# Patient Record
Sex: MALE | Race: WHITE | NOT HISPANIC OR LATINO | Employment: OTHER | URBAN - METROPOLITAN AREA
[De-identification: names, ages, dates, MRNs, and addresses within clinical notes are randomized per-mention and may not be internally consistent; named-entity substitution may affect disease eponyms.]

---

## 2020-11-23 ENCOUNTER — OFFICE VISIT (OUTPATIENT)
Dept: NEUROSURGERY | Facility: CLINIC | Age: 70
End: 2020-11-23

## 2020-11-23 VITALS
TEMPERATURE: 97.6 F | DIASTOLIC BLOOD PRESSURE: 80 MMHG | SYSTOLIC BLOOD PRESSURE: 180 MMHG | HEIGHT: 70 IN | BODY MASS INDEX: 39.31 KG/M2 | WEIGHT: 274.6 LBS

## 2020-11-23 DIAGNOSIS — M48.062 SPINAL STENOSIS, LUMBAR REGION, WITH NEUROGENIC CLAUDICATION: ICD-10-CM

## 2020-11-23 DIAGNOSIS — M51.36 DEGENERATIVE DISC DISEASE, LUMBAR: Primary | ICD-10-CM

## 2020-11-23 PROCEDURE — 99203 OFFICE O/P NEW LOW 30 MIN: CPT | Performed by: NEUROLOGICAL SURGERY

## 2020-11-23 RX ORDER — PIOGLITAZONEHYDROCHLORIDE 45 MG/1
TABLET ORAL
COMMUNITY
Start: 2020-11-16

## 2020-11-23 RX ORDER — HYDROCHLOROTHIAZIDE 12.5 MG/1
CAPSULE, GELATIN COATED ORAL
COMMUNITY
Start: 2020-11-16

## 2020-11-23 RX ORDER — GLIPIZIDE 5 MG/1
TABLET ORAL
COMMUNITY
Start: 2020-11-16

## 2020-11-23 RX ORDER — SIMVASTATIN 40 MG
TABLET ORAL
COMMUNITY
Start: 2020-11-16

## 2020-11-23 RX ORDER — NABUMETONE 750 MG/1
750 TABLET, FILM COATED ORAL 2 TIMES DAILY
Qty: 30 TABLET | Refills: 0 | Status: SHIPPED | OUTPATIENT
Start: 2020-11-23

## 2020-11-23 RX ORDER — HYDROCODONE BITARTRATE AND ACETAMINOPHEN 7.5; 325 MG/1; MG/1
1 TABLET ORAL EVERY 6 HOURS PRN
Qty: 45 TABLET | Refills: 0 | Status: SHIPPED | OUTPATIENT
Start: 2020-11-23

## 2020-11-23 RX ORDER — BENAZEPRIL HYDROCHLORIDE 40 MG/1
TABLET, FILM COATED ORAL
COMMUNITY
Start: 2020-11-16

## 2020-11-23 RX ORDER — OMEPRAZOLE 40 MG/1
CAPSULE, DELAYED RELEASE ORAL
COMMUNITY
Start: 2020-11-16

## 2020-11-23 RX ORDER — METHOCARBAMOL 750 MG/1
750 TABLET, FILM COATED ORAL NIGHTLY
Qty: 30 TABLET | Refills: 0 | Status: SHIPPED | OUTPATIENT
Start: 2020-11-23 | End: 2020-12-07

## 2020-11-23 NOTE — PROGRESS NOTES
Iggy Little  1950  [id number]      Chief Complaint   Patient presents with   • Back Pain       HISTORY OF PRESENT ILLNESS: This is a 70-year-old male seen with a chief complaint of pain in the back rating down to both lower extremities.  He previously has had a laminectomy and discectomy is done reasonably well from 2012.  That time he had a laminectomy L4.  His pain has increased considerably to the point that he is unable to walk stand for prolonged periods of time without having severe pain in his back.  It radiates primary to his thigh but not distally.  No bowel bladder dysfunction.  A CT scan was performed and he is referred for neurosurgical consultation.    No past medical history on file.    No past surgical history on file.    No family history on file.    Social History     Socioeconomic History   • Marital status:      Spouse name: Not on file   • Number of children: Not on file   • Years of education: Not on file   • Highest education level: Not on file       Not on File    No current outpatient medications on file.    Review of Systems   Constitutional: Negative for activity change, appetite change, chills, diaphoresis, fatigue, fever and unexpected weight change.   HENT: Positive for postnasal drip and tinnitus. Negative for congestion, dental problem, drooling, ear discharge, ear pain, facial swelling, hearing loss, mouth sores, nosebleeds, rhinorrhea, sinus pressure, sinus pain, sneezing, sore throat, trouble swallowing and voice change.    Eyes: Positive for itching. Negative for photophobia, pain, discharge, redness and visual disturbance.   Respiratory: Positive for apnea and shortness of breath. Negative for cough, choking, chest tightness, wheezing and stridor.    Cardiovascular: Negative for chest pain, palpitations and leg swelling.   Gastrointestinal: Negative for abdominal distention, abdominal pain, anal bleeding, blood in stool, constipation, diarrhea, nausea, rectal pain and  vomiting.   Endocrine: Negative for cold intolerance, heat intolerance, polydipsia, polyphagia and polyuria.   Genitourinary: Negative for decreased urine volume, difficulty urinating, discharge, dysuria, enuresis, flank pain, frequency, genital sores, hematuria, penile pain, penile swelling, scrotal swelling, testicular pain and urgency.   Musculoskeletal: Positive for back pain, neck pain and neck stiffness. Negative for arthralgias, gait problem, joint swelling and myalgias.   Skin: Negative for color change, pallor, rash and wound.   Allergic/Immunologic: Negative for environmental allergies, food allergies and immunocompromised state.   Neurological: Positive for numbness. Negative for dizziness, tremors, seizures, syncope, facial asymmetry, speech difficulty, weakness, light-headedness and headaches.   Hematological: Negative for adenopathy. Does not bruise/bleed easily.   Psychiatric/Behavioral: Negative for agitation, behavioral problems, confusion, decreased concentration, dysphoric mood, hallucinations, self-injury, sleep disturbance and suicidal ideas. The patient is not nervous/anxious and is not hyperactive.        There were no vitals filed for this visit.    Neurological Examination:    Mental status/speech: The patient is alert and oriented.  Speech is clear without aphysia or dysarthria.  No overt cognitive deficits.    Cranial nerve examination:    Olfaction: Smell is intact.  Vision: Vision is intact without visual field abnormalities.  Funduscopic examination is normal.  No pupillary irregularity.  Ocular motor examination: The extraocular muscles are intact.  There is no diplopia.  The pupil is round and reactive to both light and accommodation.  There is no nystagmus.  Facial movement/sensation: There is no facial weakness.  Sensation is intact in the first, second, and third divisions of the trigeminal nerve.  The corneal reflex is intact.  Auditory: Hearing is intact to finger rub  bilaterally.  Cranial nerves IX, X, XI, XII: Phonation is normal.  No dysphagia.  Tongue is protruded in the midline without atrophy.  The gag reflex is intact.  Shoulder shrug is normal.    Musculoligamentous ligamentous examination: BMI 39.4; weight 274 pounds.  Limited range of motion including flexion extension lateral rotation lateral bending.  Straight leg raising produces back pain.  He is areflexic.  His strength appears to be intact.  He has considerable amount of pain with movement standing and walking        Medical Decision Making:     Diagnostic Data Set: The CT scan data set show advanced, diffuse degenerative osteoarthritis associated with spinal stenosis at a variety of levels from L2 distally.      Assessment: Lumbar spinal stenosis with neurogenic claudication          Recommendations: I have ordered a lumbar MRI.  Based on his narrative and examination most likely he will require laminectomies.  I have given a prescription of Relafen 750 mg twice daily, Robaxin-750 milligrams at night and Lortab 7.5 for pain management.  He will return to see me on the same day that he has his MRI.  Epidural steroid injection or facet blocks may be a therapeutic option although with the degree of his arthritis and stenosis is quite significant.        I greatly appreciate the opportunity to see and evaluate this individual.  If you have questions or concerns regarding issues that I may have overlooked please call me at any time: [phone].  John Lam M.D.  [facility]  [address] .  [facility], located within [facility]  [id number]

## 2020-11-25 ENCOUNTER — TELEPHONE (OUTPATIENT)
Dept: NEUROSURGERY | Facility: CLINIC | Age: 70
End: 2020-11-25

## 2020-11-25 NOTE — TELEPHONE ENCOUNTER
Caller: TEOFILO DOMINGUEZ    Relationship to patient: PT    Best call back number: [phone]    Chief complaint: PT IS IN A LOT OF PAIN AND WOULD LIKE TO HAVE MRI AND FOLLOW UP WITH DR SÁNCHEZ MOVED UP SOONER IF POSSIBLE. PT IS TAKING THE MUSCLE RELAXERS AT NIGHT BUT DOES NOT WANT TO TAKE PAIN MEDICATION DR SÁNCHEZ PRESCRIBED. PT DENIES LOSS OF BLADDER/BOWEL CONTROL, RADIATING PAIN AND NUMBNESS IN LEGS AS IN LAST OFFICE VISIT NOTE    Type of visit: FOLLOW UP    Requested date: ANY      If rescheduling, when is the original appointment:12/7/20 3:40 PM WITH SAME DAY MRI    Additional notes: PT PREFERS OPEN MRI, WONDERS IF HE CAN BE SCHEDULED WITH AN MRI CENTER OUTSIDE  TO COMPLETE SCAN AND BE SEEN SOONER?    PLEASE CALL PT TO ADVISE IF APPT CAN BE MOVED UP. 12/8/20 IS SOONEST FOLLOW UP WITH DR SÁNCHEZ THAT [facility] CAN SCHEDULE.    THANK YOU.

## 2020-12-07 ENCOUNTER — HOSPITAL ENCOUNTER (OUTPATIENT)
Dept: MRI IMAGING | Facility: HOSPITAL | Age: 70
Discharge: HOME | End: 2020-12-07
Admitting: NEUROLOGICAL SURGERY

## 2020-12-07 ENCOUNTER — OFFICE VISIT (OUTPATIENT)
Dept: NEUROSURGERY | Facility: CLINIC | Age: 70
End: 2020-12-07

## 2020-12-07 VITALS
SYSTOLIC BLOOD PRESSURE: 150 MMHG | WEIGHT: 284 LBS | BODY MASS INDEX: 40.66 KG/M2 | DIASTOLIC BLOOD PRESSURE: 78 MMHG | HEIGHT: 70 IN

## 2020-12-07 DIAGNOSIS — M48.062 SPINAL STENOSIS, LUMBAR REGION, WITH NEUROGENIC CLAUDICATION: ICD-10-CM

## 2020-12-07 DIAGNOSIS — M51.36 DEGENERATIVE DISC DISEASE, LUMBAR: ICD-10-CM

## 2020-12-07 DIAGNOSIS — M48.062 SPINAL STENOSIS OF LUMBAR REGION WITH NEUROGENIC CLAUDICATION: Primary | ICD-10-CM

## 2020-12-07 PROCEDURE — 72148 MRI LUMBAR SPINE W/O DYE: CPT

## 2020-12-07 PROCEDURE — 99213 OFFICE O/P EST LOW 20 MIN: CPT | Performed by: NEUROLOGICAL SURGERY

## 2020-12-07 RX ORDER — CARISOPRODOL 350 MG/1
350 TABLET ORAL 2 TIMES DAILY
Qty: 30 TABLET | Refills: 0 | Status: SHIPPED | OUTPATIENT
Start: 2020-12-07

## 2020-12-07 NOTE — PROGRESS NOTES
Iggy Little  1950  [id number]                        CHIEF COMPLAINT: Back and bilateral leg pain         MEDICAL HISTORY SINCE LAST ENCOUNTER: Iggy returns today for review of his lumbar MRI.  Medication thus far has been ineffective.  He has severe pain in his back which radiates primarily into his left leg.  He radiates in the anterior aspect to his knee and subsequently distal.  The predominance of his pain is more axial; i.e. proximal to his knee.  He has pain in the right anterior thigh as well.  This is worse with standing and walking.    Lumbar MRI has been performed and he is here for further discussion.           Past Medical History:   Diagnosis Date   • Arthritis    • Asthma    • Bronchitis    • Diabetes mellitus (CMS/HCC)    • Hearing loss    • History of stomach ulcers    • History of transfusion     no reaction   • Hypertension    • Lumbar herniated disc    • Lumbosacral disc disease    • Pneumonia    • Spinal stenosis               Past Surgical History:   Procedure Laterality Date   • BACK SURGERY     • CARPAL TUNNEL RELEASE     • INNER EAR SURGERY                Family History   Problem Relation Age of Onset   • Diabetes Mother    • Diabetes Sister    • Diabetes Brother    • Hypertension Brother               Social History     Socioeconomic History   • Marital status:      Spouse name: Not on file   • Number of children: Not on file   • Years of education: Not on file   • Highest education level: Not on file   Tobacco Use   • Smoking status: Former Smoker     Years: 10.00     Types: Cigarettes, Pipe   • Smokeless tobacco: Former User     Types: Chew     Quit date: 1990   • Tobacco comment: stopped 40 years ago 1980   Substance and Sexual Activity   • Alcohol use: Never     Frequency: Never   • Drug use: Never   • Sexual activity: Defer            No Known Allergies           Current Outpatient Medications:   •  benazepril (LOTENSIN) 40 MG tablet, , Disp: , Rfl:   •  glipizide  "(GLUCOTROL) 5 MG tablet, , Disp: , Rfl:   •  hydroCHLOROthiazide (MICROZIDE) 12.5 MG capsule, , Disp: , Rfl:   •  HYDROcodone-acetaminophen (Norco) 7.5-325 MG per tablet, Take 1 tablet by mouth Every 6 (Six) Hours As Needed for Moderate Pain ., Disp: 45 tablet, Rfl: 0  •  metFORMIN (GLUCOPHAGE) 1000 MG tablet, , Disp: , Rfl:   •  nabumetone (RELAFEN) 750 MG tablet, Take 1 tablet by mouth 2 (Two) Times a Day., Disp: 30 tablet, Rfl: 0  •  omeprazole (priLOSEC) 40 MG capsule, , Disp: , Rfl:   •  pioglitazone (ACTOS) 45 MG tablet, , Disp: , Rfl:   •  simvastatin (ZOCOR) 40 MG tablet, , Disp: , Rfl:   •  carisoprodol (SOMA) 350 MG tablet, Take 1 tablet by mouth 2 (two) times a day., Disp: 30 tablet, Rfl: 0         Review of Systems            Vitals:    12/07/20 1549   BP: 150/78   BP Location: Right arm   Patient Position: Sitting   Cuff Size: Adult   Weight: 129 kg (284 lb)   Height: 177.8 cm (70\")               EXAMINATION: BMI 40.75; weight 284.  He is areflexic.  His strength appears to be intact however.            MEDICAL DECISION MAKING: The MRI shows presence of significant calcification of intervertebral disc at L5-S1 and postsurgical change at L4-5 and L5-S1.  He also has spinal stenosis and foraminal closure at L3-4.  There are no Modic changes.           ASSESSMENT/DISPOSITION: His CT scan of the lumbar spine shows that the posterior wall of the intervertebral disc L5-S1 is calcific.  MRI shows foraminal stenosis L3-4, L4-5 and L5-S1.  His symptom complex appears to be more related to L3-4.  I will give him a prescription of Soma 350 twice daily and have set him up to have an epidural steroid injection which may give some relief.  If it does not however will need to decide what is his best therapeutic options in reference to pain management.  I will continue to follow him and keep you informed.              I APPRECIATE THE OPPORTUNITY OF THIS REFERRAL. PLEASE CALL IF ANY       QUESTIONS " [phone]

## 2020-12-22 RX ORDER — METHOCARBAMOL 750 MG/1
TABLET, FILM COATED ORAL
Qty: 30 TABLET | Refills: 0 | OUTPATIENT
Start: 2020-12-22

## 2020-12-22 NOTE — TELEPHONE ENCOUNTER
Provider:  Genaro   Caller: Pharmacy  Surgery:  NA  Surgery Date:  NA  Last visit:   12/7/20  Next visit: TBD      Reason for call:       Requested Prescriptions     Pending Prescriptions Disp Refills   • methocarbamol (ROBAXIN) 750 MG tablet [Pharmacy Med Name: METHOCARBAMOL 750 MG TABLET] 30 tablet 0     Sig: TAKE 1 TABLET BY MOUTH EACH NIGHT